# Patient Record
Sex: MALE | Race: WHITE | NOT HISPANIC OR LATINO | Employment: OTHER | URBAN - METROPOLITAN AREA
[De-identification: names, ages, dates, MRNs, and addresses within clinical notes are randomized per-mention and may not be internally consistent; named-entity substitution may affect disease eponyms.]

---

## 2024-04-13 ENCOUNTER — HOSPITAL ENCOUNTER (EMERGENCY)
Facility: CLINIC | Age: 84
Discharge: HOME | End: 2024-04-13
Attending: PHYSICIAN ASSISTANT | Admitting: PHYSICIAN ASSISTANT
Payer: COMMERCIAL

## 2024-04-13 VITALS
SYSTOLIC BLOOD PRESSURE: 174 MMHG | HEART RATE: 86 BPM | TEMPERATURE: 98.8 F | DIASTOLIC BLOOD PRESSURE: 81 MMHG | RESPIRATION RATE: 18 BRPM | OXYGEN SATURATION: 98 %

## 2024-04-13 DIAGNOSIS — R51.9 FACIAL PAIN: ICD-10-CM

## 2024-04-13 LAB
ANION GAP SERPL CALCULATED.3IONS-SCNC: 12 MMOL/L (ref 7–15)
BASOPHILS # BLD AUTO: 0.1 10E3/UL (ref 0–0.2)
BASOPHILS NFR BLD AUTO: 1 %
BUN SERPL-MCNC: 15.7 MG/DL (ref 8–23)
CALCIUM SERPL-MCNC: 9.1 MG/DL (ref 8.8–10.2)
CHLORIDE SERPL-SCNC: 101 MMOL/L (ref 98–107)
CREAT SERPL-MCNC: 0.93 MG/DL (ref 0.67–1.17)
DEPRECATED HCO3 PLAS-SCNC: 23 MMOL/L (ref 22–29)
EGFRCR SERPLBLD CKD-EPI 2021: 81 ML/MIN/1.73M2
EOSINOPHIL # BLD AUTO: 0.1 10E3/UL (ref 0–0.7)
EOSINOPHIL NFR BLD AUTO: 1 %
ERYTHROCYTE [DISTWIDTH] IN BLOOD BY AUTOMATED COUNT: 13.6 % (ref 10–15)
GLUCOSE SERPL-MCNC: 99 MG/DL (ref 70–99)
HCT VFR BLD AUTO: 36.1 % (ref 40–53)
HGB BLD-MCNC: 12.3 G/DL (ref 13.3–17.7)
IMM GRANULOCYTES # BLD: 0 10E3/UL
IMM GRANULOCYTES NFR BLD: 0 %
LYMPHOCYTES # BLD AUTO: 1.6 10E3/UL (ref 0.8–5.3)
LYMPHOCYTES NFR BLD AUTO: 17 %
MCH RBC QN AUTO: 31.5 PG (ref 26.5–33)
MCHC RBC AUTO-ENTMCNC: 34.1 G/DL (ref 31.5–36.5)
MCV RBC AUTO: 93 FL (ref 78–100)
MONOCYTES # BLD AUTO: 0.6 10E3/UL (ref 0–1.3)
MONOCYTES NFR BLD AUTO: 6 %
NEUTROPHILS # BLD AUTO: 6.8 10E3/UL (ref 1.6–8.3)
NEUTROPHILS NFR BLD AUTO: 75 %
NRBC # BLD AUTO: 0 10E3/UL
NRBC BLD AUTO-RTO: 0 /100
PLATELET # BLD AUTO: 198 10E3/UL (ref 150–450)
POTASSIUM SERPL-SCNC: 3.9 MMOL/L (ref 3.4–5.3)
RBC # BLD AUTO: 3.9 10E6/UL (ref 4.4–5.9)
SODIUM SERPL-SCNC: 136 MMOL/L (ref 135–145)
WBC # BLD AUTO: 9.2 10E3/UL (ref 4–11)

## 2024-04-13 PROCEDURE — 250N000009 HC RX 250: Performed by: PHYSICIAN ASSISTANT

## 2024-04-13 PROCEDURE — 85025 COMPLETE CBC W/AUTO DIFF WBC: CPT | Performed by: PHYSICIAN ASSISTANT

## 2024-04-13 PROCEDURE — 99283 EMERGENCY DEPT VISIT LOW MDM: CPT | Mod: 25

## 2024-04-13 PROCEDURE — 64400 NJX AA&/STRD TRIGEMINAL NRV: CPT

## 2024-04-13 PROCEDURE — 36415 COLL VENOUS BLD VENIPUNCTURE: CPT | Performed by: PHYSICIAN ASSISTANT

## 2024-04-13 PROCEDURE — 80048 BASIC METABOLIC PNL TOTAL CA: CPT | Performed by: PHYSICIAN ASSISTANT

## 2024-04-13 PROCEDURE — 250N000013 HC RX MED GY IP 250 OP 250 PS 637: Performed by: PHYSICIAN ASSISTANT

## 2024-04-13 RX ORDER — BUPIVACAINE HYDROCHLORIDE AND EPINEPHRINE 5; 5 MG/ML; UG/ML
1.8 INJECTION, SOLUTION PERINEURAL ONCE
Status: COMPLETED | OUTPATIENT
Start: 2024-04-13 | End: 2024-04-13

## 2024-04-13 RX ORDER — PENICILLIN V POTASSIUM 500 MG/1
500 TABLET, FILM COATED ORAL ONCE
Status: DISCONTINUED | OUTPATIENT
Start: 2024-04-13 | End: 2024-04-13

## 2024-04-13 RX ADMIN — BUPIVACAINE HYDROCHLORIDE AND EPINEPHRINE BITARTRATE 1.8 ML: 5; .005 INJECTION, SOLUTION SUBCUTANEOUS at 20:46

## 2024-04-13 RX ADMIN — AMOXICILLIN AND CLAVULANATE POTASSIUM 1 TABLET: 875; 125 TABLET, FILM COATED ORAL at 21:45

## 2024-04-13 ASSESSMENT — ACTIVITIES OF DAILY LIVING (ADL)
ADLS_ACUITY_SCORE: 33
ADLS_ACUITY_SCORE: 33
ADLS_ACUITY_SCORE: 35

## 2024-04-13 ASSESSMENT — COLUMBIA-SUICIDE SEVERITY RATING SCALE - C-SSRS
2. HAVE YOU ACTUALLY HAD ANY THOUGHTS OF KILLING YOURSELF IN THE PAST MONTH?: NO
1. IN THE PAST MONTH, HAVE YOU WISHED YOU WERE DEAD OR WISHED YOU COULD GO TO SLEEP AND NOT WAKE UP?: NO
6. HAVE YOU EVER DONE ANYTHING, STARTED TO DO ANYTHING, OR PREPARED TO DO ANYTHING TO END YOUR LIFE?: NO

## 2024-04-14 NOTE — ED TRIAGE NOTES
Patient arrives with tooth pain on bottom left side for 3 days. Has not made a dentist appointment yet.      Triage Assessment (Adult)       Row Name 04/13/24 1950          Triage Assessment    Airway WDL WDL        Respiratory WDL    Respiratory WDL WDL        Skin Circulation/Temperature WDL    Skin Circulation/Temperature WDL WDL        Cardiac WDL    Cardiac WDL WDL        Peripheral/Neurovascular WDL    Peripheral Neurovascular WDL WDL        Cognitive/Neuro/Behavioral WDL    Cognitive/Neuro/Behavioral WDL WDL

## 2024-04-14 NOTE — ED PROVIDER NOTES
History     Chief Complaint:  Dental Problem       HPI   Dl Monroy is a 84 year old male who presents to the ED for an evaluation of dental pain. The patient reports that he developed tooth pain 2 days ago. He complains of left sided face pain and soreness in his left jaw. He states that inhaling air relives the pain. He has been using tylenol with minimal relief. Denies any fever and chills. No difficulty breathing and swallowing. No known allergy to antibiotics. Patient denies any narcotics.      Independent Historian:    Patient, as per HPI.     Review of External Notes:  None    Medications:    Asprin   Nitrostat   Lopressor   Norco   Plavix   Zanaflex    Past Medical History:    Hypertensive heart disease without heart failure   Coronary artery disease native vessel   Hyperlipidemia   Coronary stent status post  osteopenia    Past Surgical History:    Insertion of drug eluting coronary artery stent   Coronary angioplasty with stent placement       Physical Exam   Patient Vitals for the past 24 hrs:   BP Temp Temp src Pulse Resp SpO2   04/13/24 1950 (!) 174/81 98.8  F (37.1  C) Temporal 86 18 98 %        Physical Exam  General: Patient is alert, awake and interactive when I enter the room  Head: The scalp, face, and head appear normal  Eyes: Conjunctivae are normal  ENT: The nose is normal, Pinnae are normal, External acoustic canals are normal. No oropharyngeal erythema or exudate.   Dental inspection shows dental caries, no periapical abscess. Multiple teeth missing. Tooth # 19 approximately is tender. Floor of mouth is soft. Uvula is midline.   Neck: Trachea midline  CV: Pulses are normal.   Resp: No respiratory distress   Musc: Normal muscular tone, moving all extremities.  Skin: No rash or lesions noted. No facial swelling or erythema.   Neuro:  Speech is normal and fluent. Face is symmetric.   Psych: Normal affect.  Appropriate interactions.  Nursing notes and vital signs reviewed.      Emergency  Department Course     [facility]    Dental Block    Date/Time: 4/13/2024 10:41 PM    Performed by: Froylan Sullivan PA-C  Authorized by: Froylan Sullivan PA-C    Risks, benefits and alternatives discussed.      INDICATIONS     Indications: dental pain      LOCATION     Block type:  Inferior alveolar    Laterality:  Left    PROCEDURE DETAILS     Syringe type:  Aspirating dental syringe    Needle gauge:  27 G    Anesthetic injected:  Bupivacaine 0.5% WITH epi    Injection procedure:  Anatomic landmarks identified, anatomic landmarks palpated, introduced needle, negative aspiration for blood and incremental injection    POST PROCEDURE DETAILS      Outcome:  Pain unchanged      PROCEDURE    Patient Tolerance:  Patient tolerated the procedure well with no immediate complications  [facility]    Dental Block    Date/Time: 4/13/2024 10:42 PM    Performed by: Froylan Sullivan PA-C  Authorized by: Froylan Sullivan PA-C    Risks, benefits and alternatives discussed.      INDICATIONS     Indications: dental pain      LOCATION     Block type:  Supraperiosteal    Supraperiosteal location:  Lower teeth    Lower teeth location:  19/LL 1st molar    PROCEDURE DETAILS     Syringe type:  Aspirating dental syringe    Needle gauge:  27 G    Anesthetic injected:  Bupivacaine 0.5% WITH epi    Injection procedure:  Anatomic landmarks identified, anatomic landmarks palpated, introduced needle, negative aspiration for blood and incremental injection    POST PROCEDURE DETAILS      Outcome:  Pain relieved      PROCEDURE    Patient Tolerance:  Patient tolerated the procedure well with no immediate complications         Emergency Department Course & Assessments:    Interventions:  Medications   BUPivacaine 0.5 % EPINEPHrine 1:200,000 dental injection SOLN 1.8 mL (1.8 mLs Other $Given by Other 4/13/24 2046)   amoxicillin-clavulanate (AUGMENTIN) 875-125 MG per tablet 1 tablet (1 tablet Oral $Given 4/13/24  2145)          Independent Interpretation (X-rays, CTs, rhythm strip):  None    Assessments/Consultations/Discussion of Management or Tests:  ED Course as of 04/14/24 0002   Sat Apr 13, 2024 2016 I have evaluated the patient    2120 I reevaluated the patient       Social Determinants of Health affecting care:  None     Disposition:  The patient was discharged.    Impression & Plan    Medical Decision Making:  Dl Monroy is a 84 year old male who presents to the ED for evaluation of jaw/face/dental pain.  See HPI as above for additional details. Vitals and physical exam as above. DDx was broad and included periapical abscess, pulpitis, cracked tooth syndrome, facial cellulitis, gingivitis, dental juan manuel, other deep space infection, PTA, amongst others.  Pain certainly suggestive for dental etiology. Dental blocks performed as above without improvement of patient's pain. Did attempt to place CoePak on top of tooth to see if this might help with pain without improvement. Ultimately, with shared decision making, elected to pursue further work up to rule out alternative etiology of patient's symptoms. No obvious skin findings to suggest for facial cellulitis, shingles. Labs performed as above. Just as CT was coming to get patient to perform CT, patient noted that symptoms had resolved and requested discharge to home. Discussed with patient unclear etiology of symptoms at this time, however still did favor dental etiology. He will call his dentist for further evaluation. He may use Tylenol for pain. Short course of antibiotics for home as well, initial dose provided as above. Discussed reasons to return. All questions answered. Patient discharged to home in stable condition.    Diagnosis:    ICD-10-CM    1. Facial pain  R51.9            Discharge Medications:  Discharge Medication List as of 4/13/2024 10:43 PM        START taking these medications    Details   amoxicillin-clavulanate (AUGMENTIN) 875-125 MG tablet Take  1 tablet by mouth 2 times daily for 7 days, Disp-14 tablet, R-0, Local Print                Scribe Disclosure:  I, Josefavini Samuels, am serving as a scribe at 8:25 PM on 4/13/2024 to document services personally performed by Froylan Sullivan PA-C based on my observations and the provider's statements to me.  4/13/2024   Froylan Sullivan PA-C     This record was created at least in part using electronic voice recognition software, so please excuse any typographical errors.             Froylan Sullivan PA-C  04/14/24 0002